# Patient Record
Sex: FEMALE | Race: WHITE | NOT HISPANIC OR LATINO | URBAN - METROPOLITAN AREA
[De-identification: names, ages, dates, MRNs, and addresses within clinical notes are randomized per-mention and may not be internally consistent; named-entity substitution may affect disease eponyms.]

---

## 2019-06-07 ENCOUNTER — EMERGENCY (EMERGENCY)
Facility: HOSPITAL | Age: 19
LOS: 0 days | Discharge: HOME | End: 2019-06-07
Attending: EMERGENCY MEDICINE | Admitting: EMERGENCY MEDICINE
Payer: MEDICAID

## 2019-06-07 VITALS
RESPIRATION RATE: 18 BRPM | DIASTOLIC BLOOD PRESSURE: 77 MMHG | HEART RATE: 84 BPM | SYSTOLIC BLOOD PRESSURE: 137 MMHG | OXYGEN SATURATION: 100 %

## 2019-06-07 VITALS
WEIGHT: 102.96 LBS | SYSTOLIC BLOOD PRESSURE: 141 MMHG | HEART RATE: 100 BPM | TEMPERATURE: 96 F | DIASTOLIC BLOOD PRESSURE: 70 MMHG | OXYGEN SATURATION: 100 % | HEIGHT: 61 IN | RESPIRATION RATE: 20 BRPM

## 2019-06-07 DIAGNOSIS — R10.32 LEFT LOWER QUADRANT PAIN: ICD-10-CM

## 2019-06-07 DIAGNOSIS — O26.891 OTHER SPECIFIED PREGNANCY RELATED CONDITIONS, FIRST TRIMESTER: ICD-10-CM

## 2019-06-07 PROCEDURE — 99284 EMERGENCY DEPT VISIT MOD MDM: CPT | Mod: 25

## 2019-06-07 NOTE — ED PROVIDER NOTE - OBJECTIVE STATEMENT
18F p/w first pregnancy, LMP 4/24/19, c/o mild intermittent LLQ pain. Denies f/c, dysuria, hematuria, flank pain, vag bleeding/discharge.

## 2019-06-07 NOTE — ED PROVIDER NOTE - PHYSICAL EXAMINATION
General: AOx4, Non toxic appearing, NAD, speaking in full sentences.   Skin: Warm and dry, no acute rash.   Head:  Normocephalic, atraumatic.   EENT: PERRL/EOMI, conjunctiva and sclera clear. MM moist, no nasal discharge.   Neck: Supple nt, no meningeal signs.   Lymph: No acute cervical lymphadenopathy  Heart RRR s1s2 nl, no rub/murmur.   Lungs- No retractions, BS equal, CTAB.   Abdomen: Soft ntnd no r/g.   Extremities- moves all, +equal distal pulses, brisk cap refill. No LE edema, calves nttp b/l.  Neuro: Sensation wnl, CN 2-12 grossly intact. +5/5 muscle strength throughout.  Psych: Cooperative, appropriate

## 2019-06-07 NOTE — ED PROVIDER NOTE - ATTENDING CONTRIBUTION TO CARE
18F p/w first pregnancy, LMP 4/24/19, c/o mild intermittent LLQ pain, dull, non radiating, no cp/sob, o n/v/d, no loc. Patient has never been pregnant before, no confirmed IUP for this pregnancy    CONSTITUTIONAL: Well-developed; well-nourished; in no acute distress. Sitting up and providing appropriate history and physical examination  SKIN: skin exam is warm and dry, no acute rash.  HEAD: Normocephalic; atraumatic.  EYES: PERRL, 3 mm bilateral, no nystagmus, EOM intact; conjunctiva and sclera clear.  ENT: No nasal discharge; airway clear.  NECK: Supple; non tender. + full passive ROM in all directions. No JVD  CARD: S1, S2 normal; no murmurs, gallops, or rubs. Regular rate and rhythm. + Symmetric Strong Pulses  RESP: No wheezes, rales or rhonchi. Good air movement bilaterally  ABD: soft; non-distended; non-tender. No Rebound, No Guarding, No signs of peritonitis, No CVA tenderness. No pulsatile abdominal mass. + Strong and Symmetric Pulses  EXT: Normal ROM. No clubbing, cyanosis or edema. Dp and Pt Pulses intact. Cap refill less than 3 seconds  NEURO: CN 2-12 intact, normal finger to nose, normal romberg, stable gait, no sensory or motor deficits, Alert, oriented, grossly unremarkable. No Focal deficits. GCS 15. NIH 0  PSYCH: Cooperative, appropriate.     bedside US: Unable to visualize POC under direct bedside transabdominal US

## 2019-06-07 NOTE — ED PROVIDER NOTE - NS ED ROS FT
Constitutional: NAD  Eyes: No visual changes, eye pain or discharge.  ENMT: No hearing changes, pain, discharge or infections. No neck pain or stiffness.  Cardiac: No chest pain, SOB or edema. No chest pain with exertion.  Respiratory: No cough or respiratory distress.   GI: No nausea, vomiting, diarrhea. +abdominal pain.  : No dysuria, frequency or burning.  MS: No myalgia, muscle weakness, joint pain or back pain.  Neuro: No headache or weakness. No LOC.  Skin: No skin rash.  Heme: No bruising

## 2019-06-07 NOTE — ED PROVIDER NOTE - CLINICAL SUMMARY MEDICAL DECISION MAKING FREE TEXT BOX
I personally evaluated the patient. I reviewed the Resident’s or Physician Assistant’s note (as assigned above), and agree with the findings and plan except as documented in my note. No US capability at the [place] at this time, no IUP confirmed, I instructed the patient that I would like to transfer her north for US, patient declined, asked to sign out ama and will follow with er OP OBGYN. The patient wishes to leave against medical advice.  I have discussed the risks, benefits and alternatives (including the possibility of worsening of disease, pain, permanent disability, and/or death) with the patient and his/her family (if available).  The patient voices understanding of these risks, benefits, and alternatives and still wishes to sign out against medical advice.  The patient is awake, alert, oriented x 3 and has demonstrated capacity to refuse/direct care.  I have advised the patient that they can and should return immediately should they develop any worse/different/additional symptoms, or if they change their mind and want to continue their care. Patient has full capacity and has verbalized understanding.  Given detailed returned precautions. Patient understands risk of missing ectopic

## 2019-06-07 NOTE — ED PROVIDER NOTE - NSFOLLOWUPCLINICS_GEN_ALL_ED_FT
[facility]  OB/GYN  [address] [place] [address]  Phone: [phone]  Fax:   Follow Up Time:

## 2019-06-07 NOTE — ED PROVIDER NOTE - PROGRESS NOTE DETAILS
workup discussed w/ pt, she is not amenable to transfer north. Attempted to evaluate w/ pocus but unable to visualize IUP. pt prefers to return to ED in AM when US is available. Discussed risks, recommended transfer North. Will AMA. Pt states she will return in AM. Gave return precautions.

## 2019-09-10 ENCOUNTER — EMERGENCY (EMERGENCY)
Facility: HOSPITAL | Age: 19
LOS: 0 days | Discharge: HOME | End: 2019-09-11
Attending: PEDIATRICS | Admitting: PEDIATRICS
Payer: MEDICAID

## 2019-09-10 VITALS
WEIGHT: 109.79 LBS | TEMPERATURE: 98 F | SYSTOLIC BLOOD PRESSURE: 122 MMHG | DIASTOLIC BLOOD PRESSURE: 56 MMHG | OXYGEN SATURATION: 98 % | HEART RATE: 75 BPM | RESPIRATION RATE: 20 BRPM

## 2019-09-10 DIAGNOSIS — O23.02 INFECTIONS OF KIDNEY IN PREGNANCY, SECOND TRIMESTER: ICD-10-CM

## 2019-09-10 DIAGNOSIS — Z3A.21 21 WEEKS GESTATION OF PREGNANCY: ICD-10-CM

## 2019-09-10 DIAGNOSIS — R10.9 UNSPECIFIED ABDOMINAL PAIN: ICD-10-CM

## 2019-09-10 DIAGNOSIS — Z79.2 LONG TERM (CURRENT) USE OF ANTIBIOTICS: ICD-10-CM

## 2019-09-10 DIAGNOSIS — N12 TUBULO-INTERSTITIAL NEPHRITIS, NOT SPECIFIED AS ACUTE OR CHRONIC: ICD-10-CM

## 2019-09-10 LAB
APPEARANCE UR: ABNORMAL
BILIRUB UR-MCNC: NEGATIVE — SIGNIFICANT CHANGE UP
COLOR SPEC: YELLOW — SIGNIFICANT CHANGE UP
DIFF PNL FLD: SIGNIFICANT CHANGE UP
GLUCOSE UR QL: NEGATIVE — SIGNIFICANT CHANGE UP
KETONES UR-MCNC: NEGATIVE — SIGNIFICANT CHANGE UP
LEUKOCYTE ESTERASE UR-ACNC: ABNORMAL
NITRITE UR-MCNC: NEGATIVE — SIGNIFICANT CHANGE UP
PH UR: 6.5 — SIGNIFICANT CHANGE UP (ref 5–8)
PROT UR-MCNC: ABNORMAL
SP GR SPEC: 1.01 — SIGNIFICANT CHANGE UP (ref 1.01–1.02)
UROBILINOGEN FLD QL: SIGNIFICANT CHANGE UP

## 2019-09-10 PROCEDURE — 99284 EMERGENCY DEPT VISIT MOD MDM: CPT

## 2019-09-10 RX ORDER — CEFPODOXIME PROXETIL 100 MG
1 TABLET ORAL
Qty: 20 | Refills: 0
Start: 2019-09-10 | End: 2019-09-19

## 2019-09-10 RX ORDER — ACETAMINOPHEN 500 MG
650 TABLET ORAL ONCE
Refills: 0 | Status: DISCONTINUED | OUTPATIENT
Start: 2019-09-10 | End: 2019-09-11

## 2019-09-10 NOTE — ED PROVIDER NOTE - NS ED ROS FT
Review of Systems         Constitutional: (-) fever (-) chills (-) weakness       Head: (-) trauma       EENT: (-) visual changes (-) sore throat       Cardiovascular: (-) chest pain (-) syncope       Respiratory: (-) cough, (-) shortness of breath       Gastrointestinal: (-) abdominal pain (-) vomiting (-) diarrhea (-) nausea       Genitourinary: (-) dysuria (+) frequency (-) hematuria       Musculoskeletal: (-) neck pain (-) back pain (-) joint pain       Integumentary: (-) rash       Neurological: (-) headache (-) altered mental status (-) dizziness (-) paresthesias       Psych: (-) psych history

## 2019-09-10 NOTE — ED PROVIDER NOTE - PATIENT PORTAL LINK FT
You can access the FollowMyHealth Patient Portal offered by [facility] by registering at the following website: [email or web address]. By joining [employer]’s FollowMyHealth portal, you will also be able to view your health information using other applications (apps) compatible with our system.

## 2019-09-10 NOTE — ED PROVIDER NOTE - PHYSICAL EXAMINATION
Physical Exam    Vital Signs: I have reviewed the initial vital signs  Constitutional: well-nourished, appears stated age, no acute distress  EENT: Conjunctiva pink, Sclera clear, PERRLA, EOMI. Mucous membranes moist, no exudates or lesions noted, uvula midline.  Cardiovascular: S1 and S2 present, regular rate, regular rhythm. Well perfused extremities, no peripheral edema  Respiratory: unlabored respiratory effort, clear to auscultation bilaterally no wheezing, rales or rhonchi  Gastrointestinal: soft, non-tender abdomen. No guarding or rebound tenderness  Musculoskeletal: supple nontender neck, no midline tenderness, no joint pain. + left sided CVAT  Integumentary: warm, dry, no rash  Neurologic: A & O x 3, all extremities’ motor and sensory functions grossly intact  Psychiatric: appropriate mood, appropriate affect

## 2019-09-10 NOTE — ED PROVIDER NOTE - NSFOLLOWUPINSTRUCTIONS_ED_ALL_ED_FT
-Take prescribed antibiotics as prescribed for your infection.  -Tylenol for pain  -Continue to push fluids  -Follow up with your OB/GYN in 1-3 days  -Return to ED for worsening symptoms or concerns.    Pyelonephritis During Pregnancy  Image   What are the causes?  This condition is caused by a bacterial infection in the lower urinary tract that spreads to the kidney.    What increases the risk?  You are more likely to develop this condition if:  You have diabetes.  You have a history of frequent urinary tract infections.  What are the signs or symptoms?  Symptoms of this condition may begin with symptoms of a lower urinary tract infection. These may include:  A frequent urge to pass urine.  Burning pain when passing urine.  Pain and pressure in your lower abdomen.  Blood in your urine.  Cloudy or smelly urine.  As the infection spreads to your kidney, you may have these symptoms:  Fever.  Chills.  Pain and tenderness in your upper abdomen or in your back and sides (flank pain). Flank pain often affects one side of the body, usually the right side.  Nausea, vomiting, or loss of appetite.  How is this diagnosed?  This condition may be diagnosed based on:  Your symptoms and medical history.  A physical exam.   Tests to confirm the diagnosis. These may include:  Blood tests to check kidney function and look for signs of infection.  Urine tests to check for signs of infection, including bacteria, white or red blood cells, and protein.  Tests to grow and identify the type of bacteria that is causing the infection (urine culture).  Imaging studies of your kidneys to learn more about your condition.  How is this treated?  This condition is treated in the hospital with antibiotics that are given into one of your veins through an IV. Your health care provider:  Will start you on an antibiotic that is effective against common urinary tract infections.  May switch to another antibiotic if the results of your urine culture show that your infection is caused by different bacteria.  Will be careful to choose antibiotics that are the safest during pregnancy.  Other treatments may include:  IV fluids if you are nauseous and not able to drink fluids.  Pain and fever medicines.  Medicines for nausea and vomiting.  You will be able to go home when your infection is under control. To prevent another infection, you may need to continue taking antibiotics by mouth until your baby is born.    Follow these instructions at home:  Medicines     Image   Take over-the-counter and prescription medicines only as told by your health care provider.  Take your antibiotic medicine as told by your health care provider. Do not stop taking the antibiotic even if you start to feel better.  Continue to take your prenatal vitamins.  Lifestyle     Image   Follow instructions from your health care provider about eating or drinking restrictions. You may need to avoid:  Foods and drinks with added sugar.  Caffeine and fruit juice.  Drink enough fluid to keep your urine pale yellow.  Go to the bathroom frequently. Do not hold your urine. Try to empty your bladder completely.  Change your underwear every day. Wear all-cotton underwear. Do not wear tight underwear or pants.  General instructions     Image   Take these steps to lower the risk of bacteria getting into your urinary tract:  Use liquid soap instead of bar soap when showering or bathing. Bacteria can grow on bar soap.  When you wash yourself, clean the urethra opening first. Use a washcloth to clean the area between your vagina and anus. Pat the area dry with a clean towel.  Wash your hands before and after you go to the bathroom.  Wipe yourself from front to back after going to the bathroom.  Do not use douches, perfumed soap, creams, or powders.  Do not soak in a bath for more than 30 minutes.  Return to your normal activities as told by your health care provider. Ask your health care provider what activities are safe for you.  Keep all follow-up visits as told by your health care provider. This is important.  Contact a health care provider if:  You have chills or a fever.  You have any symptoms of infection that do not get better at home.  Symptoms of infection come back.  You have a reaction or side effects from your antibiotic.  Get help right away if:  You start having contractions.  Summary  Pyelonephritis is an infection of the kidney or kidneys.  This condition results when a bacterial infection in the lower urinary tract spreads to the kidney.  Lower urinary tract infections are common during pregnancy.  Pyelonephritis causes chills, a fever, flank pain, and nausea.  Pyelonephritis is a serious infection that is usually treated in the hospital with IV antibiotics.

## 2019-09-10 NOTE — ED PROVIDER NOTE - OBJECTIVE STATEMENT
18 year old female G1PO 21 weeks gestation presents with left sided flank pain x 2 days. Patient states pain is in her left flank, radiating to front, mild, worsening., no pall/prov. Has nto taken any analgesia. Denies abd pain, fevers, chills, chest pain, shortness of breath, cough, dysuria, n/v, vaginal d/c, vaginal bleeding. patient admits to urgency and frequency.

## 2019-09-11 PROBLEM — Z78.9 OTHER SPECIFIED HEALTH STATUS: Chronic | Status: ACTIVE | Noted: 2019-06-07

## 2019-09-11 LAB
BACTERIA # UR AUTO: ABNORMAL
EPI CELLS # UR: 9 /HPF — HIGH (ref 0–5)
HYALINE CASTS # UR AUTO: 13 /LPF — HIGH (ref 0–7)
RBC CASTS # UR COMP ASSIST: 5 /HPF — HIGH (ref 0–4)
WBC UR QL: 109 /HPF — HIGH (ref 0–5)

## 2019-09-12 LAB
CULTURE RESULTS: SIGNIFICANT CHANGE UP
SPECIMEN SOURCE: SIGNIFICANT CHANGE UP

## 2019-12-29 ENCOUNTER — OUTPATIENT (OUTPATIENT)
Dept: OUTPATIENT SERVICES | Facility: HOSPITAL | Age: 19
LOS: 1 days | Discharge: HOME | End: 2019-12-29

## 2019-12-29 VITALS
DIASTOLIC BLOOD PRESSURE: 78 MMHG | TEMPERATURE: 99 F | RESPIRATION RATE: 16 BRPM | SYSTOLIC BLOOD PRESSURE: 129 MMHG | HEART RATE: 70 BPM

## 2019-12-29 LAB
ALBUMIN SERPL ELPH-MCNC: 3.4 G/DL — LOW (ref 3.5–5.2)
ALP SERPL-CCNC: 211 U/L — HIGH (ref 30–115)
ALT FLD-CCNC: 10 U/L — SIGNIFICANT CHANGE UP (ref 0–41)
ANION GAP SERPL CALC-SCNC: 15 MMOL/L — HIGH (ref 7–14)
APPEARANCE UR: CLEAR — SIGNIFICANT CHANGE UP
AST SERPL-CCNC: 17 U/L — SIGNIFICANT CHANGE UP (ref 0–41)
BASOPHILS # BLD AUTO: 0.03 K/UL — SIGNIFICANT CHANGE UP (ref 0–0.2)
BASOPHILS NFR BLD AUTO: 0.3 % — SIGNIFICANT CHANGE UP (ref 0–1)
BILIRUB SERPL-MCNC: 0.3 MG/DL — SIGNIFICANT CHANGE UP (ref 0.2–1.2)
BILIRUB UR-MCNC: NEGATIVE — SIGNIFICANT CHANGE UP
BUN SERPL-MCNC: 9 MG/DL — LOW (ref 10–20)
CALCIUM SERPL-MCNC: 8.9 MG/DL — SIGNIFICANT CHANGE UP (ref 8.5–10.1)
CHLORIDE SERPL-SCNC: 103 MMOL/L — SIGNIFICANT CHANGE UP (ref 98–110)
CO2 SERPL-SCNC: 20 MMOL/L — SIGNIFICANT CHANGE UP (ref 17–32)
COLOR SPEC: YELLOW — SIGNIFICANT CHANGE UP
CREAT SERPL-MCNC: 0.6 MG/DL — LOW (ref 0.7–1.5)
DIFF PNL FLD: NEGATIVE — SIGNIFICANT CHANGE UP
EOSINOPHIL # BLD AUTO: 0.05 K/UL — SIGNIFICANT CHANGE UP (ref 0–0.7)
EOSINOPHIL NFR BLD AUTO: 0.5 % — SIGNIFICANT CHANGE UP (ref 0–8)
GLUCOSE SERPL-MCNC: 96 MG/DL — SIGNIFICANT CHANGE UP (ref 70–99)
GLUCOSE UR QL: NEGATIVE — SIGNIFICANT CHANGE UP
HCT VFR BLD CALC: 30.9 % — LOW (ref 37–47)
HGB BLD-MCNC: 10.8 G/DL — LOW (ref 12–16)
IMM GRANULOCYTES NFR BLD AUTO: 1 % — HIGH (ref 0.1–0.3)
KETONES UR-MCNC: NEGATIVE — SIGNIFICANT CHANGE UP
LDH SERPL L TO P-CCNC: 182 — SIGNIFICANT CHANGE UP (ref 50–242)
LEUKOCYTE ESTERASE UR-ACNC: NEGATIVE — SIGNIFICANT CHANGE UP
LYMPHOCYTES # BLD AUTO: 1.73 K/UL — SIGNIFICANT CHANGE UP (ref 1.2–3.4)
LYMPHOCYTES # BLD AUTO: 17.2 % — LOW (ref 20.5–51.1)
MCHC RBC-ENTMCNC: 30 PG — SIGNIFICANT CHANGE UP (ref 27–31)
MCHC RBC-ENTMCNC: 35 G/DL — SIGNIFICANT CHANGE UP (ref 32–37)
MCV RBC AUTO: 85.8 FL — SIGNIFICANT CHANGE UP (ref 81–99)
MONOCYTES # BLD AUTO: 0.66 K/UL — HIGH (ref 0.1–0.6)
MONOCYTES NFR BLD AUTO: 6.6 % — SIGNIFICANT CHANGE UP (ref 1.7–9.3)
NEUTROPHILS # BLD AUTO: 7.48 K/UL — HIGH (ref 1.4–6.5)
NEUTROPHILS NFR BLD AUTO: 74.4 % — SIGNIFICANT CHANGE UP (ref 42.2–75.2)
NITRITE UR-MCNC: NEGATIVE — SIGNIFICANT CHANGE UP
NRBC # BLD: 0 /100 WBCS — SIGNIFICANT CHANGE UP (ref 0–0)
PH UR: 6.5 — SIGNIFICANT CHANGE UP (ref 5–8)
PLATELET # BLD AUTO: 239 K/UL — SIGNIFICANT CHANGE UP (ref 130–400)
POTASSIUM SERPL-MCNC: 4.2 MMOL/L — SIGNIFICANT CHANGE UP (ref 3.5–5)
POTASSIUM SERPL-SCNC: 4.2 MMOL/L — SIGNIFICANT CHANGE UP (ref 3.5–5)
PROT SERPL-MCNC: 5.9 G/DL — LOW (ref 6–8)
PROT UR-MCNC: ABNORMAL
RBC # BLD: 3.6 M/UL — LOW (ref 4.2–5.4)
RBC # FLD: 12.5 % — SIGNIFICANT CHANGE UP (ref 11.5–14.5)
SODIUM SERPL-SCNC: 138 MMOL/L — SIGNIFICANT CHANGE UP (ref 135–146)
SP GR SPEC: 1.02 — SIGNIFICANT CHANGE UP (ref 1.01–1.02)
URATE SERPL-MCNC: 5.2 MG/DL — SIGNIFICANT CHANGE UP (ref 2.5–7)
UROBILINOGEN FLD QL: SIGNIFICANT CHANGE UP
WBC # BLD: 10.05 K/UL — SIGNIFICANT CHANGE UP (ref 4.8–10.8)
WBC # FLD AUTO: 10.05 K/UL — SIGNIFICANT CHANGE UP (ref 4.8–10.8)

## 2019-12-29 NOTE — OB RN TRIAGE NOTE - NS_TRIAGEADDITIONAL COMMENTS_OBGYN_ALL_OB_FT
Dr Perrin at bedside, pt states "I want to leave", pt educated on risk factors.  Patient verbalized understanding.  Dr Jerez made aware.  Marlene Cortés, AND notified and made aware.

## 2019-12-29 NOTE — OB PROVIDER TRIAGE NOTE - NSHPPHYSICALEXAM_GEN_ALL_CORE
Vital Signs Last 24 Hrs  T(C): 37 (29 Dec 2019 21:14), Max: 37 (29 Dec 2019 21:14)  T(F): 98.6 (29 Dec 2019 21:14), Max: 98.6 (29 Dec 2019 21:14)  HR: 66 (29 Dec 2019 22:11) (66 - 78)  BP: 121/68 (29 Dec 2019 22:26) (118/69 - 147/81) first elevated 12/29 @2140  RR: 16 (29 Dec 2019 21:14) (16 - 16)    Gen: NAD, sitting comfortably  Abd: Gravid, soft, NT, no palpable ctx  SVE: deferred per PMD  EFM: 130/mod/+accels, cat I  [place]: irregular  EFW by Leopold's: 3200g Vital Signs Last 24 Hrs  T(C): 37 (29 Dec 2019 21:14), Max: 37 (29 Dec 2019 21:14)  T(F): 98.6 (29 Dec 2019 21:14), Max: 98.6 (29 Dec 2019 21:14)  HR: 66 (29 Dec 2019 22:11) (66 - 78)  BP: 121/68 (29 Dec 2019 22:26) (118/69 - 147/81) first elevated 12/29 @2140  RR: 16 (29 Dec 2019 21:14) (16 - 16)    Gen: NAD, sitting comfortably  Abd: Gravid, soft, NT, no palpable ctx  SVE: deferred per PMD  EFM: 130/mod/+accels, cat I  [place]: irregular  EFW by Leopold's: 3200g  BSS: ceph, post fundal placenta, EFW 2597g (37.6%), BPP 8/8, MVP 5.35cm Vital Signs Last 24 Hrs  T(C): 37 (29 Dec 2019 21:14), Max: 37 (29 Dec 2019 21:14)  T(F): 98.6 (29 Dec 2019 21:14), Max: 98.6 (29 Dec 2019 21:14)  HR: 66 (29 Dec 2019 22:11) (66 - 78)  BP: 121/68 (29 Dec 2019 22:26) (118/69 - 147/81) first elevated 12/29 @2140  RR: 16 (29 Dec 2019 21:14) (16 - 16)    Gen: NAD, sitting comfortably  Cardio: RRR, no m/r/g  Resp: CTAB, no w/r/r  Abd: Gravid, soft, NT, no palpable ctx  Ext: 2+ BL LE swelling, 2+ upper extremity DTRs  SVE: deferred per PMD  EFM: 130/mod/+accels, cat I  [place]: irregular  EFW by Leopold's: 3200g  BSS: ceph, post fundal placenta, EFW 2597g (37.6%), BPP 8/8, MVP 5.35cm

## 2019-12-29 NOTE — OB PROVIDER TRIAGE NOTE - HISTORY OF PRESENT ILLNESS
18yo  @ 20yo  @35w4d with BELKIS  by LMP  consistent with first trimester sono per patient here for BL lower extremity swelling for the last 2 weeks, worsening over the last 2 days, no calf tenderness.  Denies VB, LOF, ctx.  Good FM.  Denies headache, changes in vision, chest pain, SOB, RUQ/epigastric pain, N/V, fevers, chills, diarrhea, constipation, dysuria.  Sees Dr. Muir at [facility], last visit was , PMD at that time said LE swelling was within normal limits.  Reports she was prescribed PO iron but has not been taking it.  Last PO intake was 1730.  Does not remember when she had her last BM.  Last intercourse 3 days ago.  Denies any complications with this pregnancy, did not bring records with her at this time.

## 2019-12-29 NOTE — OB PROVIDER TRIAGE NOTE - ADDITIONAL INSTRUCTIONS
If you notice bright red blood enough to soak a pad, a large gush of fluid or continuous leakage of fluid, or decreased fetal movement please come to labor and delivery.  If you notice a headache that won't go away, changes in vision, chest pain, shortness of breath, abdominal pain, severe leg swelling or pain please call your provider or go to the hospital. Fetal kick counts should be monitored, and 5-6 kicks should be felt per hour.  If less, please call your doctor.  Please follow up with your primary ObGyn as soon as possible.  If you have any questions about your time here, call [phone].

## 2019-12-29 NOTE — OB PROVIDER TRIAGE NOTE - NSOBPROVIDERNOTE_OBGYN_ALL_OB_FT
20yo  @35w4d, GBS unknown, r/o gHTN vs. preeclampsia, NST reactive, maternal and fetal wellbeing reassuring  -BPs q15m  -bedrest with bathroom privileges  -cont efm/toco  -f/u PELs, urine protein creatinine ratio  -obtain PNC from Dr. Wood Jerez and Dr. Miller aware. 18yo  @35w4d, GBS unknown, r/o gHTN vs. preeclampsia, BPP 10/10, maternal and fetal wellbeing reassuring  -BPs q15m  -bedrest with bathroom privileges  -cont efm/toco  -f/u PELs, urine protein creatinine ratio  -obtain PNC from Dr. Wood Jerez and Dr. Miller aware.

## 2019-12-30 VITALS — RESPIRATION RATE: 18 BRPM | TEMPERATURE: 98 F

## 2019-12-30 LAB
BACTERIA # UR AUTO: NEGATIVE — SIGNIFICANT CHANGE UP
BLD GP AB SCN SERPL QL: SIGNIFICANT CHANGE UP
CREAT ?TM UR-MCNC: 133 MG/DL — SIGNIFICANT CHANGE UP
EPI CELLS # UR: 9 /HPF — HIGH (ref 0–5)
HYALINE CASTS # UR AUTO: 3 /LPF — SIGNIFICANT CHANGE UP (ref 0–7)
PROT ?TM UR-MCNC: 55 MG/DLG/24H — SIGNIFICANT CHANGE UP
PROT/CREAT UR-RTO: 0.4 RATIO — HIGH (ref 0–0.2)
RBC CASTS # UR COMP ASSIST: 3 /HPF — SIGNIFICANT CHANGE UP (ref 0–4)
WBC UR QL: 12 /HPF — HIGH (ref 0–5)

## 2019-12-30 NOTE — PROGRESS NOTE ADULT - SUBJECTIVE AND OBJECTIVE BOX
Subjective:   Pt evaluated at bedside. Continues to deny headache, changes in vision, chest pain, SOB, RUQ/epigastric pain, N/V, fevers, chills, diarrhea.  Report stable BL LE swelling, no calf pain.  Desires discharge against medical advice.    Objective:   Vital Signs Last 24 Hrs  T(C): 37 (29 Dec 2019 21:14), Max: 37 (29 Dec 2019 21:14)  T(F): 98.6 (29 Dec 2019 21:14), Max: 98.6 (29 Dec 2019 21:14)  HR: 71 (30 Dec 2019 00:08) (59 - 78)  BP: 138/81 (30 Dec 2019 00:08) (118/69 - 147/85) First elevated  @2140  RR: 16 (29 Dec 2019 21:14) (16 - 16)    EFM: 130/mod/+accel, cat I  TOCO: irregular  SVE: deferred per PMD  Gen: NAD, AAOx3  CV: RRR, no M/R/G  Pulm: CTAB, no R/R/W  Abd: soft, gravid, nontender, no rebound or guarding, no epigastric tenderness, +BS.   Ext: +2 LE edema jose, no calf tenderness  Neuro: 2+ BL upper extremity reflexes      penicillin (Hives)      Labs:                        10.8   10.05 )-----------( 239      ( 29 Dec 2019 22:11 )             30.9         138  |  103  |  9<L>  ----------------------------<  96  4.2   |  20  |  0.6<L>    Ca    8.9      29 Dec 2019 22:11    TPro  5.9<L>  /  Alb  3.4<L>  /  TBili  0.3  /  DBili  x   /  AST  17  /  ALT  10  /  AlkPhos  211<H>      Uric Acid, Serum: 5.2 mg/dL ( @ 22:11)  Lactate Dehydrogenase, Serum (.19 @ 22:11)    Lactate Dehydrogenase, Serum: 182  Urinalysis Basic - ( 29 Dec 2019 22:11 )    Color: Yellow / Appearance: Clear / S.022 / pH: x  Gluc: x / Ketone: Negative  / Bili: Negative / Urobili: <2 mg/dL   Blood: x / Protein: 30 mg/dL / Nitrite: Negative   Leuk Esterase: Negative / RBC: 3 /HPF / WBC 12 /HPF   Sq Epi: x / Non Sq Epi: 9 /HPF / Bacteria: Negative

## 2019-12-30 NOTE — PROGRESS NOTE ADULT - ASSESSMENT
A/P: 20yo  @35w5d, GBS unknown, cannot r/o gHTN vs. preeclampsia, asymptomatic, BPP 10/10, maternal and fetal wellbeing reassuring    -Discharge home against medical advice: risks explained including but not limited to stroke, PE, myocardial infarction, fetal distress, seizures, fetal demise and death.  Patient agreed to accept these risks and signed the form stating her understanding.  -to follow up with PMD as soon as possible  -f/u urine protein creatinine ratio  -hypertensive and labor precautions discussed  -encouraged leg elevation and compression socks for BL LE swelling  -FKC encouraged, PO hydration encouraged    Dr. Jerez and Dr. Miller aware.

## 2020-01-30 ENCOUNTER — EMERGENCY (EMERGENCY)
Facility: HOSPITAL | Age: 20
LOS: 0 days | Discharge: HOME | End: 2020-01-30
Attending: EMERGENCY MEDICINE | Admitting: EMERGENCY MEDICINE
Payer: MEDICAID

## 2020-01-30 VITALS
SYSTOLIC BLOOD PRESSURE: 111 MMHG | DIASTOLIC BLOOD PRESSURE: 53 MMHG | OXYGEN SATURATION: 100 % | RESPIRATION RATE: 17 BRPM | TEMPERATURE: 98 F | HEART RATE: 68 BPM
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
## 2020-01-30 DIAGNOSIS — Z88.0 ALLERGY STATUS TO PENICILLIN: ICD-10-CM

## 2020-01-30 LAB
ALBUMIN SERPL ELPH-MCNC: 3.2 G/DL — LOW (ref 3.5–5.2)
ALP SERPL-CCNC: 127 U/L — HIGH (ref 30–115)
ALT FLD-CCNC: 31 U/L — SIGNIFICANT CHANGE UP (ref 0–41)
ANION GAP SERPL CALC-SCNC: 12 MMOL/L — SIGNIFICANT CHANGE UP (ref 7–14)
AST SERPL-CCNC: 39 U/L — SIGNIFICANT CHANGE UP (ref 0–41)
BASOPHILS # BLD AUTO: 0.09 K/UL — SIGNIFICANT CHANGE UP (ref 0–0.2)
BASOPHILS NFR BLD AUTO: 0.5 % — SIGNIFICANT CHANGE UP (ref 0–1)
BILIRUB SERPL-MCNC: 0.6 MG/DL — SIGNIFICANT CHANGE UP (ref 0.2–1.2)
BUN SERPL-MCNC: 18 MG/DL — SIGNIFICANT CHANGE UP (ref 10–20)
CALCIUM SERPL-MCNC: 9 MG/DL — SIGNIFICANT CHANGE UP (ref 8.5–10.1)
CHLORIDE SERPL-SCNC: 103 MMOL/L — SIGNIFICANT CHANGE UP (ref 98–110)
CO2 SERPL-SCNC: 23 MMOL/L — SIGNIFICANT CHANGE UP (ref 17–32)
CREAT SERPL-MCNC: 0.7 MG/DL — SIGNIFICANT CHANGE UP (ref 0.7–1.5)
EOSINOPHIL # BLD AUTO: 0.15 K/UL — SIGNIFICANT CHANGE UP (ref 0–0.7)
EOSINOPHIL NFR BLD AUTO: 0.9 % — SIGNIFICANT CHANGE UP (ref 0–8)
GLUCOSE SERPL-MCNC: 104 MG/DL — HIGH (ref 70–99)
HCT VFR BLD CALC: 25.6 % — LOW (ref 37–47)
HGB BLD-MCNC: 8.7 G/DL — LOW (ref 12–16)
IMM GRANULOCYTES NFR BLD AUTO: 1.6 % — HIGH (ref 0.1–0.3)
LYMPHOCYTES # BLD AUTO: 1.65 K/UL — SIGNIFICANT CHANGE UP (ref 1.2–3.4)
LYMPHOCYTES # BLD AUTO: 10 % — LOW (ref 20.5–51.1)
MCHC RBC-ENTMCNC: 29.3 PG — SIGNIFICANT CHANGE UP (ref 27–31)
MCHC RBC-ENTMCNC: 34 G/DL — SIGNIFICANT CHANGE UP (ref 32–37)
MCV RBC AUTO: 86.2 FL — SIGNIFICANT CHANGE UP (ref 81–99)
MONOCYTES # BLD AUTO: 0.89 K/UL — HIGH (ref 0.1–0.6)
MONOCYTES NFR BLD AUTO: 5.4 % — SIGNIFICANT CHANGE UP (ref 1.7–9.3)
NEUTROPHILS # BLD AUTO: 13.52 K/UL — HIGH (ref 1.4–6.5)
NEUTROPHILS NFR BLD AUTO: 81.6 % — HIGH (ref 42.2–75.2)
NRBC # BLD: 0 /100 WBCS — SIGNIFICANT CHANGE UP (ref 0–0)
PLATELET # BLD AUTO: 538 K/UL — HIGH (ref 130–400)
POTASSIUM SERPL-MCNC: 3.8 MMOL/L — SIGNIFICANT CHANGE UP (ref 3.5–5)
POTASSIUM SERPL-SCNC: 3.8 MMOL/L — SIGNIFICANT CHANGE UP (ref 3.5–5)
PROT SERPL-MCNC: 6.3 G/DL — SIGNIFICANT CHANGE UP (ref 6–8)
RBC # BLD: 2.97 M/UL — LOW (ref 4.2–5.4)
RBC # FLD: 13.8 % — SIGNIFICANT CHANGE UP (ref 11.5–14.5)
SODIUM SERPL-SCNC: 138 MMOL/L — SIGNIFICANT CHANGE UP (ref 135–146)
WBC # BLD: 16.56 K/UL — HIGH (ref 4.8–10.8)
WBC # FLD AUTO: 16.56 K/UL — HIGH (ref 4.8–10.8)

## 2020-01-30 PROCEDURE — 99284 EMERGENCY DEPT VISIT MOD MDM: CPT

## 2020-01-30 RX ORDER — AZTREONAM 2 G
1 VIAL (EA) INJECTION
Qty: 14 | Refills: 0
Start: 2020-01-30 | End: 2020-02-05

## 2020-01-30 RX ADMIN — Medication 1 TABLET(S): at 05:56

## 2020-01-30 NOTE — CONSULT NOTE ADULT - ASSESSMENT
20yo  s/p primary LTCS for failure to dilate, POD#15, with post- wound infection, clinically and hemodynamically stable  -Wound drained and packed with 1/4" packing, patient tolerated well  -recommend CT abd/pelvis to ensure no deeper fluid collections  -recommend antibiotic prophylaxis with Bactrim DS BID x5 days  -sent Percocet to pharmacy, to use prior to repacking  -f/u wound culture  -recommend f/u at [facility] in 1-2 to remove and change packing, if patient is unable to schedule appointment then recommend removal of packing in 2 days and follow up at [facility] as soon as possible    Dr. Plascencia and Dr. Roy aware. 20yo  s/p primary LTCS for failure to dilate, POD#15, with post- wound infection, clinically and hemodynamically stable  -Wound drained and packed with 1/4" packing, patient tolerated well  -recommend CT abd/pelvis to ensure no deeper fluid collections  -recommend antibiotic prophylaxis with Bactrim DS BID x5 days  -sent Naproxen to pharmacy, to use prior to repacking  -f/u wound culture  -recommend f/u at [facility] in 1-2 to remove and change packing, if patient is unable to schedule appointment then recommend removal of packing in 2 days and follow up at [facility] as soon as possible    Dr. Plascencia and Dr. Roy aware.

## 2020-01-30 NOTE — ED PROVIDER NOTE - OBJECTIVE STATEMENT
pt presents to ED c/o bleeding from  site for 1 day. ob  at [facility], but  done by another physician, cannot recall name, also at [facility]. s/p  2 weeks ago, has been taking tylenol for pain. last dose a few hrs ago. Denies fever/chill/HA/dizziness/chest pain/palpitation/sob/abd pain/n/v/d/ black stool/bloody stool/urinary sxs

## 2020-01-30 NOTE — ED PROVIDER NOTE - ATTENDING CONTRIBUTION TO CARE
19y f s/p  1/15/20 @ [facility] by on-call service ObGyn (pt does not recall name of doctor) p/w drainage from wound x 1d. Rpts incr swelling under L side of  scar x few days, had followed up with her private Gyn Dr. Muir few days ago (did not perform ) and was told wound was healing well. Got into car this evening and noticed sudden-onset drainage of serosanguinous fluid from L side of surgical site. Wound draining on arrival, soaked through several 4x4s. Has been having vaginal spottin since delivery, otherwise no other sx. Denies any f/c, nvd, vag discharge.    PE:  nad  skin warm, dry  ncat  neck supple  rrr nl s1s2 no mrg  ctab no wrr  abd soft, healing  scar with active serosanguinous drainage from L side of wound, no discrete fluctuacne, no surrounding erythema, remainder of abd ntnd no palpable masses no rgr  back non-tender no cvat  ext no cce dpi  neuro aaox3 grossly nf exam

## 2020-01-30 NOTE — ED PROVIDER NOTE - PROGRESS NOTE DETAILS
ob/gyn c/s called, plan for labs and imaging GYN resident evaluated patient, open and irrigated wound. wound cx sent/ f/u with her at [facility] or her GYN patient was offered CT to evaluated the wound and patient refused.

## 2020-01-30 NOTE — CONSULT NOTE ADULT - SUBJECTIVE AND OBJECTIVE BOX
Chief Complaint: c/s incisional bleeding    HPI: 20yo  s/p primary LTCS for failure to dilate after IOL for preeclampsia, POD#15, presenting to the ED for incisional bleeding starting 3 hours ago suddenly. Denies any trauma, lifting or straining.     Location -  Severity -  Quality -  Duration -  Timing -   Modifying Factors -   Associated Signs/Symptoms -     Ob/Gyn History:  G P                 LMP -                   Cycle Length -   Denies history of ovarian cysts, uterine fibroids, abnormal paps, or STIs  Last Pap Smear -   Last Mammogram -   Last Colonoscopy -        Denies the following: constitutional symptoms, visual symptoms, cardiovascular symptoms, respiratory symptoms, GI symptoms, musculoskeletal symptoms, skin symptoms, neurologic symptoms, hematologic symptoms, allergic symptoms, psychiatric symptoms  Except any pertinent positives listed.     Home Medications:      Allergies    penicillin (Hives)    Intolerances        PAST MEDICAL & SURGICAL HISTORY:  No pertinent past medical history  No significant past surgical history      FAMILY HISTORY:  No pertinent family history in first degree relatives      SOCIAL HISTORY: Denies cigarette use, alcohol use, or illicit drug use    Vital Signs Last 24 Hrs  T(F): 98.5 (2020 01:08), Max: 98.5 (2020 01:08)  HR: 68 (2020 01:08) (68 - 68)  BP: 111/53 (2020 01:08) (111/53 - 111/53)  RR: 17 (2020 01:08) (17 - 17)      General Appearance - AAOx3, NAD  Heart - S1S2 regular rate and rhythm  Lung - CTA Bilaterally  Abdomen - Soft, nontender, nondistended, no rebound, no rigidity, no guarding, bowel sounds present    GYN/Pelvis:    Labia Majora - Normal  Labia Minora - Normal  Clitoris - Normal  Urethra - Normal  Vagina - Normal  Cervix - Normal    Uterus:  Size - Normal  Tenderness - None  Mass - None  Freely mobile    Adnexa:  Masses - None  Tenderness - None      Meds:         LABS:                        8.7    16.56 )-----------( 538      ( 2020 02:00 )             25.6         -    138  |  103  |  18  ----------------------------<  104<H>  3.8   |  23  |  0.7    Ca    9.0      2020 02:00    TPro  6.3  /  Alb  3.2<L>  /  TBili  0.6  /  DBili  x   /  AST  39  /  ALT  31  /  AlkPhos  127<H>              RADIOLOGY & ADDITIONAL STUDIES: Chief Complaint: c/s incisional bleeding    HPI: 18yo  s/p primary LTCS for failure to dilate after IOL for preeclampsia, POD#15, presenting to the ED for incisional bleeding starting 3 hours ago suddenly. Denies any trauma, lifting or straining. In the ED, has used 4-5 4x4 in an hour to control bleeding.  Notice some swelling around the incision on the left side since yesterday that is now resolving.  Denies heavy vaginal bleeding, diarrhea, constipation, N/V, fevers, chills, severe abdominal pain. Was taking labetalol 200 BID for 2 weeks per her PMD.  Denies headache, changes in vision, chest pain, SOB, RUQ/epigastric pain, LE pain/swelling.    Ob/Gyn History:          FT LTCS x1 for arrest of dilation, preeclampsia without severe features.            Denies history of ovarian cysts, uterine fibroids, abnormal paps, or STIs  Last Pap Smear unknown      Denies the following: constitutional symptoms, visual symptoms, cardiovascular symptoms, respiratory symptoms, GI symptoms, musculoskeletal symptoms, skin symptoms, neurologic symptoms, hematologic symptoms, allergic symptoms, psychiatric symptoms  Except any pertinent positives listed.     Home Medications:  None    Allergies    penicillin (Hives)            PAST MEDICAL & SURGICAL HISTORY:  No pertinent past medical history  Primary  section      FAMILY HISTORY:  No pertinent family history in first degree relatives      SOCIAL HISTORY: Denies cigarette use, alcohol use, or illicit drug use    Vital Signs Last 24 Hrs  T(F): 98.5 (2020 01:08), Max: 98.5 (2020 01:08)  HR: 68 (2020 01:08) (68 - 68)  BP: 111/53 (2020 01:08) (111/53 - 111/53)  RR: 17 (2020 01:08) (17 - 17)    General Appearance - AAOx3, NAD  Heart - S1S2 regular rate and rhythm  Lung - CTA Bilaterally  Abdomen - Soft, nontender, nondistended, no rebound, no rigidity, no guarding, bowel sounds present, uterus firm and 2cm below umbilicus  Incision - Well-healed pfannenstiel incision with 1cm opening left of midline, 20-25cc of bright red bloody fluid expressed.  On probing, 3cm deep, 5cm to the left and 4cm to the right.  Mild erythema surrounding incision.  Incision packed with 1/4" packing.     GYN/Pelvis:    Labia Majora - Normal  Labia Minora - Normal  Clitoris - Normal  Urethra - Normal  Vagina - 5cc of dark red clots in vaginal vault  Cervix - normal, no CMT    Uterus:  Size - Normal, anteverted  Tenderness - None  Mass - None  Freely mobile    Adnexa:  Masses - None  Tenderness - None        LABS:                        8.7    16.56 )-----------( 538      ( 2020 02:00 )             25.6             138  |  103  |  18  ----------------------------<  104<H>  3.8   |  23  |  0.7    Ca    9.0      2020 02:00    TPro  6.3  /  Alb  3.2<L>  /  TBili  0.6  /  DBili  x   /  AST  39  /  ALT  31  /  AlkPhos  127<H>              RADIOLOGY & ADDITIONAL STUDIES: Chief Complaint: c/s incisional bleeding    HPI: 18yo  s/p primary LTCS for failure to dilate after IOL for preeclampsia, POD#15, presenting to the ED for incisional bleeding starting 3 hours ago suddenly. Denies any trauma, lifting or straining. In the ED, has used 4-5 4x4 in an hour to control bleeding.  Notice some swelling around the incision on the left side since yesterday that is now resolving after draining.  Denies heavy vaginal bleeding, diarrhea, constipation, N/V, fevers, chills, severe abdominal pain. Was taking labetalol 200 BID for 2 weeks per her PMD.  Denies headache, changes in vision, chest pain, SOB, RUQ/epigastric pain, LE pain/swelling.    Ob/Gyn History:          FT LTCS x1 for arrest of dilation, preeclampsia without severe features.            Denies history of ovarian cysts, uterine fibroids, abnormal paps, or STIs  Last Pap Smear unknown      Denies the following: constitutional symptoms, visual symptoms, cardiovascular symptoms, respiratory symptoms, GI symptoms, musculoskeletal symptoms, skin symptoms, neurologic symptoms, hematologic symptoms, allergic symptoms, psychiatric symptoms  Except any pertinent positives listed.     Home Medications:  None    Allergies    penicillin (Hives)            PAST MEDICAL & SURGICAL HISTORY:  No pertinent past medical history  Primary  section      FAMILY HISTORY:  No pertinent family history in first degree relatives      SOCIAL HISTORY: Denies cigarette use, alcohol use, or illicit drug use    Vital Signs Last 24 Hrs  T(F): 98.5 (2020 01:08), Max: 98.5 (2020 01:08)  HR: 68 (2020 01:08) (68 - 68)  BP: 111/53 (2020 01:08) (111/53 - 111/53)  RR: 17 (2020 01:08) (17 - 17)    General Appearance - AAOx3, NAD  Heart - S1S2 regular rate and rhythm  Lung - CTA Bilaterally  Abdomen - Soft, nontender, nondistended, no rebound, no rigidity, no guarding, bowel sounds present, uterus firm and 2cm below umbilicus  Incision - pfannenstiel incision with 1cm opening midline, 20-25cc of bright red bloody fluid expressed.  On probing, 3cm deep, 5cm to the left and 4cm to the right.  Mild erythema surrounding incision.  Incision packed with 1/4" packing.     GYN/Pelvis:    Labia Majora - Normal  Labia Minora - Normal  Clitoris - Normal  Urethra - Normal  Vagina - 5cc of dark red clots in vaginal vault  Cervix - normal, no CMT    Uterus:  Size - Normal, anteverted  Tenderness - None  Mass - None  Freely mobile    Adnexa:  Masses - None  Tenderness - None        LABS:                        8.7    16.56 )-----------( 538      ( 2020 02:00 )             25.6             138  |  103  |  18  ----------------------------<  104<H>  3.8   |  23  |  0.7    Ca    9.0      2020 02:00    TPro  6.3  /  Alb  3.2<L>  /  TBili  0.6  /  DBili  x   /  AST  39  /  ALT  31  /  AlkPhos  127<H>              RADIOLOGY & ADDITIONAL STUDIES:

## 2020-01-30 NOTE — ED PROVIDER NOTE - CLINICAL SUMMARY MEDICAL DECISION MAKING FREE TEXT BOX
post  wound inf/abscess - labs wnl, ObGyn consulted and rec CT AP to r/o deeper infection however pt refused CT - wound site drained & packed @ bedside by ObGyn team, wound Cx ordered and sent by ObGyn, rec bactrim x 7d and close OP f/u @ [facility] or with private ObGyn in 1-2d, pt instructed on wound care / packin changes @ home - all results d/w pt &  @ bedside and copies given, strict return precautions discussed, Rx given, rec close op ObGYn f/u as stated

## 2020-01-30 NOTE — ED PROVIDER NOTE - PHYSICAL EXAMINATION
CONSTITUTIONAL: Well-appearing; well-nourished; in no apparent distress.   GI/: mildly ttp at csection site, otherwise non-distended; no palpable organomegaly.    SKIN: bleeding from small punctate opening left side of  wound, ?purulence vs serous fluid mixed with blood; otherwise normal for age and race; warm; dry; good turgor; no apparent lesions or exudate.   NEURO/PSYCH: A & O x 4; grossly unremarkable. mood and manner are appropriate. Grooming and personal hygiene are appropriate. No apparent thoughts of harm to self or others.

## 2020-01-30 NOTE — ED PROVIDER NOTE - PATIENT PORTAL LINK FT
You can access the FollowMyHealth Patient Portal offered by [facility] by registering at the following website: [email or web address]. By joining [employer]’s FollowMyHealth portal, you will also be able to view your health information using other applications (apps) compatible with our system.

## 2020-02-11 ENCOUNTER — APPOINTMENT (OUTPATIENT)
Dept: OBGYN | Facility: CLINIC | Age: 20
End: 2020-02-11

## 2020-02-11 PROBLEM — Z00.00 ENCOUNTER FOR PREVENTIVE HEALTH EXAMINATION: Status: ACTIVE | Noted: 2020-02-11

## 2020-12-04 NOTE — OB RN TRIAGE NOTE - NS_ATTENDNOTIFIED_OBGYN_ALL_OB
[FreeTextEntry1] : Labs show\par -Type 2 diabetes uncontrolled= increase metformin 1000 mg b.i.d.\par -Potassium 5.4-check 3 weeks\par -Calcium of 11.5 = stable, results faxed to endocrinology\par -Significantly elevated triglycerides with low HDL= recommend adding vascepa No Yes

## 2021-02-03 NOTE — OB RN TRIAGE NOTE - TOBACCO USE
[] : No [Yes] : Yes [2 - 4 times a month (2 pts)] : 2-4 times a month (2 points) [1 or 2 (0 pts)] : 1 or 2 (0 points) [Never (0 pts)] : Never (0 points) [No falls in past year] : Patient reported no falls in the past year [No] : In the past 12 months have you used drugs other than those required for medical reasons? No [0] : 2) Feeling down, depressed, or hopeless: Not at all (0) [Audit-CScore] : 2 [[id number]] : 0 [Patient reported colonoscopy was abnormal] : Patient reported colonoscopy was abnormal [HIV Test offered] : HIV Test offered [Change in mental status noted] : No change in mental status noted [Language] : denies difficulty with language [Behavior] : denies difficulty with behavior [Learning/Retaining New Information] : denies difficulty learning/retaining new information [Handling Complex Tasks] : denies difficulty handling complex tasks [Reasoning] : denies difficulty with reasoning [Spatial Ability and Orientation] : denies difficulty with spatial ability and orientation [None] : None [With Family] : lives with family [Employed] : employed [Sexually Active] : not sexually active [High Risk Behavior] : no high risk behavior [Feels Safe at Home] : Feels safe at home [Fully functional (bathing, dressing, toileting, transferring, walking, feeding)] : Fully functional (bathing, dressing, toileting, transferring, walking, feeding) [Fully functional (using the telephone, shopping, preparing meals, housekeeping, doing laundry, using] : Fully functional and needs no help or supervision to perform IADLs (using the telephone, shopping, preparing meals, housekeeping, doing laundry, using transportation, managing medications and managing finances) [ColonoscopyDate] : 06/2020 [ColonoscopyComments] : reepat in 5 years  [HepatitisCComments] : negative  [HepatitisCDate] : 02/2019 [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

## 2024-09-09 ENCOUNTER — EMERGENCY (EMERGENCY)
Facility: HOSPITAL | Age: 24
LOS: 0 days | Discharge: HOME | End: 2024-09-09
Attending: EMERGENCY MEDICINE
Payer: MEDICAID

## 2024-09-09 ENCOUNTER — TRANSCRIPTION ENCOUNTER (OUTPATIENT)
Age: 24
End: 2024-09-09

## 2024-09-09 VITALS
OXYGEN SATURATION: 100 % | TEMPERATURE: 99 F | HEART RATE: 81 BPM | WEIGHT: 105.38 LBS | DIASTOLIC BLOOD PRESSURE: 70 MMHG | HEIGHT: 61 IN | SYSTOLIC BLOOD PRESSURE: 105 MMHG | RESPIRATION RATE: 20 BRPM

## 2024-09-09 LAB
ALBUMIN SERPL ELPH-MCNC: 4.7 G/DL — SIGNIFICANT CHANGE UP (ref 3.5–5.2)
ALP SERPL-CCNC: 51 U/L — SIGNIFICANT CHANGE UP (ref 30–115)
ALT FLD-CCNC: 12 U/L — SIGNIFICANT CHANGE UP (ref 0–41)
ANION GAP SERPL CALC-SCNC: 11 MMOL/L — SIGNIFICANT CHANGE UP (ref 7–14)
AST SERPL-CCNC: 18 U/L — SIGNIFICANT CHANGE UP (ref 0–41)
BASOPHILS # BLD AUTO: 0.03 K/UL — SIGNIFICANT CHANGE UP (ref 0–0.2)
BASOPHILS NFR BLD AUTO: 0.3 % — SIGNIFICANT CHANGE UP (ref 0–1)
BILIRUB SERPL-MCNC: 0.7 MG/DL — SIGNIFICANT CHANGE UP (ref 0.2–1.2)
BUN SERPL-MCNC: 10 MG/DL — SIGNIFICANT CHANGE UP (ref 10–20)
CALCIUM SERPL-MCNC: 9.4 MG/DL — SIGNIFICANT CHANGE UP (ref 8.4–10.5)
CHLORIDE SERPL-SCNC: 105 MMOL/L — SIGNIFICANT CHANGE UP (ref 98–110)
CO2 SERPL-SCNC: 21 MMOL/L — SIGNIFICANT CHANGE UP (ref 17–32)
CREAT SERPL-MCNC: 0.6 MG/DL — LOW (ref 0.7–1.5)
EGFR: 128 ML/MIN/1.73M2 — SIGNIFICANT CHANGE UP
EOSINOPHIL # BLD AUTO: 0.06 K/UL — SIGNIFICANT CHANGE UP (ref 0–0.7)
EOSINOPHIL NFR BLD AUTO: 0.6 % — SIGNIFICANT CHANGE UP (ref 0–8)
GLUCOSE SERPL-MCNC: 87 MG/DL — SIGNIFICANT CHANGE UP (ref 70–99)
HCG SERPL QL: POSITIVE
HCG SERPL-ACNC: 538.9 MIU/ML — HIGH
HCT VFR BLD CALC: 37.6 % — SIGNIFICANT CHANGE UP (ref 37–47)
HGB BLD-MCNC: 12.6 G/DL — SIGNIFICANT CHANGE UP (ref 12–16)
IMM GRANULOCYTES NFR BLD AUTO: 0.3 % — SIGNIFICANT CHANGE UP (ref 0.1–0.3)
LIDOCAIN IGE QN: 41 U/L — SIGNIFICANT CHANGE UP (ref 7–60)
LYMPHOCYTES # BLD AUTO: 1.76 K/UL — SIGNIFICANT CHANGE UP (ref 1.2–3.4)
LYMPHOCYTES # BLD AUTO: 18.8 % — LOW (ref 20.5–51.1)
MCHC RBC-ENTMCNC: 29.4 PG — SIGNIFICANT CHANGE UP (ref 27–31)
MCHC RBC-ENTMCNC: 33.5 G/DL — SIGNIFICANT CHANGE UP (ref 32–37)
MCV RBC AUTO: 87.6 FL — SIGNIFICANT CHANGE UP (ref 81–99)
MONOCYTES # BLD AUTO: 0.47 K/UL — SIGNIFICANT CHANGE UP (ref 0.1–0.6)
MONOCYTES NFR BLD AUTO: 5 % — SIGNIFICANT CHANGE UP (ref 1.7–9.3)
NEUTROPHILS # BLD AUTO: 7 K/UL — HIGH (ref 1.4–6.5)
NEUTROPHILS NFR BLD AUTO: 75 % — SIGNIFICANT CHANGE UP (ref 42.2–75.2)
NRBC # BLD: 0 /100 WBCS — SIGNIFICANT CHANGE UP (ref 0–0)
PLATELET # BLD AUTO: 336 K/UL — SIGNIFICANT CHANGE UP (ref 130–400)
PMV BLD: 10.1 FL — SIGNIFICANT CHANGE UP (ref 7.4–10.4)
POTASSIUM SERPL-MCNC: 4.7 MMOL/L — SIGNIFICANT CHANGE UP (ref 3.5–5)
POTASSIUM SERPL-SCNC: 4.7 MMOL/L — SIGNIFICANT CHANGE UP (ref 3.5–5)
PROT SERPL-MCNC: 7.1 G/DL — SIGNIFICANT CHANGE UP (ref 6–8)
RBC # BLD: 4.29 M/UL — SIGNIFICANT CHANGE UP (ref 4.2–5.4)
RBC # FLD: 12.2 % — SIGNIFICANT CHANGE UP (ref 11.5–14.5)
SODIUM SERPL-SCNC: 137 MMOL/L — SIGNIFICANT CHANGE UP (ref 135–146)
WBC # BLD: 9.35 K/UL — SIGNIFICANT CHANGE UP (ref 4.8–10.8)
WBC # FLD AUTO: 9.35 K/UL — SIGNIFICANT CHANGE UP (ref 4.8–10.8)

## 2024-09-09 PROCEDURE — 76856 US EXAM PELVIC COMPLETE: CPT

## 2024-09-09 PROCEDURE — 80053 COMPREHEN METABOLIC PANEL: CPT

## 2024-09-09 PROCEDURE — 84702 CHORIONIC GONADOTROPIN TEST: CPT

## 2024-09-09 PROCEDURE — 84703 CHORIONIC GONADOTROPIN ASSAY: CPT

## 2024-09-09 PROCEDURE — 87186 SC STD MICRODIL/AGAR DIL: CPT

## 2024-09-09 PROCEDURE — 99284 EMERGENCY DEPT VISIT MOD MDM: CPT

## 2024-09-09 PROCEDURE — 99284 EMERGENCY DEPT VISIT MOD MDM: CPT | Mod: 25

## 2024-09-09 PROCEDURE — 85025 COMPLETE CBC W/AUTO DIFF WBC: CPT

## 2024-09-09 PROCEDURE — 87086 URINE CULTURE/COLONY COUNT: CPT

## 2024-09-09 PROCEDURE — 36415 COLL VENOUS BLD VENIPUNCTURE: CPT

## 2024-09-09 PROCEDURE — 96374 THER/PROPH/DIAG INJ IV PUSH: CPT

## 2024-09-09 PROCEDURE — 83690 ASSAY OF LIPASE: CPT

## 2024-09-09 PROCEDURE — 76856 US EXAM PELVIC COMPLETE: CPT | Mod: 26

## 2024-09-09 RX ORDER — ONDANSETRON 2 MG/ML
1 INJECTION, SOLUTION INTRAMUSCULAR; INTRAVENOUS
Qty: 6 | Refills: 0
Start: 2024-09-09 | End: 2024-09-11

## 2024-09-09 RX ORDER — METOCLOPRAMIDE HCL 5 MG
10 TABLET ORAL ONCE
Refills: 0 | Status: COMPLETED | OUTPATIENT
Start: 2024-09-09 | End: 2024-09-09

## 2024-09-09 RX ORDER — SODIUM CHLORIDE 9 MG/ML
1000 INJECTION INTRAMUSCULAR; INTRAVENOUS; SUBCUTANEOUS ONCE
Refills: 0 | Status: COMPLETED | OUTPATIENT
Start: 2024-09-09 | End: 2024-09-09

## 2024-09-09 RX ORDER — ONDANSETRON 2 MG/ML
4 INJECTION, SOLUTION INTRAMUSCULAR; INTRAVENOUS ONCE
Refills: 0 | Status: DISCONTINUED | OUTPATIENT
Start: 2024-09-09 | End: 2024-09-09

## 2024-09-09 RX ADMIN — SODIUM CHLORIDE 1000 MILLILITER(S): 9 INJECTION INTRAMUSCULAR; INTRAVENOUS; SUBCUTANEOUS at 16:36

## 2024-09-09 RX ADMIN — Medication 104 MILLIGRAM(S): at 18:38

## 2024-09-09 NOTE — ED PROVIDER NOTE - PATIENT PORTAL LINK FT
You can access the FollowMyHealth Patient Portal offered by [facility] by registering at the following website: [email or web address]. By joining [employer]’s FollowMyHealth portal, you will also be able to view your health information using other applications (apps) compatible with our system.

## 2024-09-09 NOTE — ED PROVIDER NOTE - OBJECTIVE STATEMENT
23-year-old female denies significant past medical history presents complaint of nausea vomiting and diarrhea.  States yesterday she ate chicken from takeout and noted that some of the chicken was raw.  Reports since then she had multiple episodes of NBNB emesis and watery nonbloody diarrhea which stopped 1 hour prior to arrival in emergency department.  Endorses persistent nausea.  States she does not have any abdominal pain.  Also reports she believes she may be pregnant as she has not had her period in the past month.  Denies fever/chills, chest pain, shortness of breath, abdominal pain, dysuria/frequency/urgency/hematuria, vaginal bleeding/discharge, lightheadedness, dizziness.

## 2024-09-09 NOTE — ED PROVIDER NOTE - CLINICAL SUMMARY MEDICAL DECISION MAKING FREE TEXT BOX
23-year-old female presents to the ED for nausea vomiting diarrhea.  Last LMP 4 weeks ago.  Physical exam is unremarkable.  Abdomen soft nondistended.  We obtained labs along with ultrasound.  Patient did not want to wait for the results of the ultrasound.  Beta-hCG of 500.  Denies any vaginal bleeding.  Patient wanted to leave prior to ultrasound results.  AMA form signed.  Risks conveyed to patient.  Patient had capacity.  Currently overall well-appearing and tolerating p.o.  No UA resulted.  Discharged with AMA form signed.  Return precautions given.  Zofran sent to the pharmacy.

## 2024-09-09 NOTE — ED ADULT TRIAGE NOTE - CHIEF COMPLAINT QUOTE
Pt states she ate chicken from a restaurant yesterday and while eating noticed the chicken was raw, pt now c/o abdomina pain, n/v/d

## 2024-09-09 NOTE — ED PROVIDER NOTE - NSFOLLOWUPINSTRUCTIONS_ED_ALL_ED_FT
*********  You didn't want to wait for the official result of transabdominal sonogram for pelvis and possible GYN evaluation in emergency department. Please follow up with your GYN in 48 hours for repeat bHCG. Return to ED immediately for worsening symptoms and pain.   **********    (Pregnancy of unknown location) At Risk Ectopic Pregnancy    WHAT YOU NEED TO KNOW:    Ectopic pregnancy occurs when a fertilized egg attaches and begins to grow outside of the uterus. The most common place for this to happen is in the fallopian tube. This is sometimes called a tubal pregnancy. The egg can also implant on the outside of the uterus, on the ovary or cervix, or in the abdomen. The egg may begin to grow, but the pregnancy cannot continue normally. Ectopic pregnancy can cause heavy bleeding and may be life-threatening.Ectopic Pregnancy         DISCHARGE INSTRUCTIONS:    Call your local emergency number (911 in the ) if:     You have chest pain or trouble breathing.        Call your doctor if:     You have sharp pain in your lower abdomen that is severe and starts suddenly.      You feel lightheaded or like you are going to faint.      You have increasing abdominal or pelvic pain or heavy vaginal bleeding.      You have shoulder pain.      You have a fever.      You have questions or concerns about your condition or care.    Medicines: You may need any of the following:     Prescription pain medicine may be given. Ask your healthcare provider how to take this medicine safely. Some prescription pain medicines contain acetaminophen. Do not take other medicines that contain acetaminophen without talking to your healthcare provider. Too much acetaminophen may cause liver damage. Prescription pain medicine may cause constipation. Ask your healthcare provider how to prevent or treat constipation.       Acetaminophen decreases pain and fever. It is available without a doctor's order. Ask how much to take and how often to take it. Follow directions. Read the labels of all other medicines you are using to see if they also contain acetaminophen, or ask your doctor or pharmacist. Acetaminophen can cause liver damage if not taken correctly. Do not use more than 4 grams (4,000 milligrams) total of acetaminophen in one day.       Take your medicine as directed. Contact your healthcare provider if you think your medicine is not helping or if you have side effects. Tell him or her if you are allergic to any medicine. Keep a list of the medicines, vitamins, and herbs you take. Include the amounts, and when and why you take them. Bring the list or the pill bottles to follow-up visits. Carry your medicine list with you in case of an emergency.    If you received methotrexate:     Do not have sex, and limit physical activity. Heavy physical activity or sexual intercourse may cause the ectopic pregnancy to rupture. This can be life-threatening. Your healthcare provider will tell you when it is okay to have sex and return to your normal activities.      Do not get pregnant until your healthcare provider says it is okay. Methotrexate will be harmful to an unborn baby. You will need to wait until at least 1 monthly cycle after you finish the methotrexate course to get pregnant. Your provider may want you to wait until after you have had 3 monthly cycles. This will help make sure all the methotrexate is out of your body.      Avoid folic acid and NSAID medicines. Folic acid, and NSAIDs, such as ibuprofen, prevent methotrexate from working correctly. Do not take folic acid supplements or have foods that are high in folic acid. Examples include orange juice, breakfast cereal, and leafy green vegetables. Your healthcare provider can give you more information on foods to avoid.      You may have some spotting and abdominal pain. This may start a few days after you begin taking methotrexate. These are normal and should only last a short time. Talk to your healthcare provider if you have any concerns.      Limit sunlight exposure. Sunlight can cause a condition caused methotrexate dermatitis (skin irritation).    For support and more information:     The [employer]  [address] [address]  [place] [address]  Phone: [phone]  Phone: [phone]  Web Address: http://www.acog.org      Follow up with your gynecologist as directed: You may need to return for a follow-up exam, treatment, or blood tests. If you received methotrexate to stop your pregnancy, it is important to come in for follow-up tests. Write down your questions so you remember to ask them during your visits. *********  You didn't want to wait for the official result of transabdominal sonogram for pelvis and possible GYN evaluation in emergency department. Please follow up with your GYN in 48 hours for repeat bHCG. Return to ED immediately for worsening symptoms and pain.   **********    (Pregnancy of unknown location) At Risk Ectopic Pregnancy    WHAT YOU NEED TO KNOW:    Ectopic pregnancy occurs when a fertilized egg attaches and begins to grow outside of the uterus. The most common place for this to happen is in the fallopian tube. This is sometimes called a tubal pregnancy. The egg can also implant on the outside of the uterus, on the ovary or cervix, or in the abdomen. The egg may begin to grow, but the pregnancy cannot continue normally. Ectopic pregnancy can cause heavy bleeding and may be life-threatening.Ectopic Pregnancy         DISCHARGE INSTRUCTIONS:    Call your local emergency number (911 in the ) if:     You have chest pain or trouble breathing.        Call your doctor if:     You have sharp pain in your lower abdomen that is severe and starts suddenly.      You feel lightheaded or like you are going to faint.      You have increasing abdominal or pelvic pain or heavy vaginal bleeding.      You have shoulder pain.      You have a fever.      You have questions or concerns about your condition or care.    Medicines: You may need any of the following:     Prescription pain medicine may be given. Ask your healthcare provider how to take this medicine safely. Some prescription pain medicines contain acetaminophen. Do not take other medicines that contain acetaminophen without talking to your healthcare provider. Too much acetaminophen may cause liver damage. Prescription pain medicine may cause constipation. Ask your healthcare provider how to prevent or treat constipation.       Acetaminophen decreases pain and fever. It is available without a doctor's order. Ask how much to take and how often to take it. Follow directions. Read the labels of all other medicines you are using to see if they also contain acetaminophen, or ask your doctor or pharmacist. Acetaminophen can cause liver damage if not taken correctly. Do not use more than 4 grams (4,000 milligrams) total of acetaminophen in one day.       Take your medicine as directed. Contact your healthcare provider if you think your medicine is not helping or if you have side effects. Tell him or her if you are allergic to any medicine. Keep a list of the medicines, vitamins, and herbs you take. Include the amounts, and when and why you take them. Bring the list or the pill bottles to follow-up visits. Carry your medicine list with you in case of an emergency.    If you received methotrexate:     Do not have sex, and limit physical activity. Heavy physical activity or sexual intercourse may cause the ectopic pregnancy to rupture. This can be life-threatening. Your healthcare provider will tell you when it is okay to have sex and return to your normal activities.      Do not get pregnant until your healthcare provider says it is okay. Methotrexate will be harmful to an unborn baby. You will need to wait until at least 1 monthly cycle after you finish the methotrexate course to get pregnant. Your provider may want you to wait until after you have had 3 monthly cycles. This will help make sure all the methotrexate is out of your body.      Avoid folic acid and NSAID medicines. Folic acid, and NSAIDs, such as ibuprofen, prevent methotrexate from working correctly. Do not take folic acid supplements or have foods that are high in folic acid. Examples include orange juice, breakfast cereal, and leafy green vegetables. Your healthcare provider can give you more information on foods to avoid.      You may have some spotting and abdominal pain. This may start a few days after you begin taking methotrexate. These are normal and should only last a short time. Talk to your healthcare provider if you have any concerns.      Limit sunlight exposure. Sunlight can cause a condition caused methotrexate dermatitis (skin irritation).    For support and more information:     The [employer]  [address] [address]  [place] [address]  Phone: [phone]  Phone: [phone]  Web Address: http://www.acog.org      Follow up with your gynecologist as directed: You may need to return for a follow-up exam, treatment, or blood tests. If you received methotrexate to stop your pregnancy, it is important to come in for follow-up tests. Write down your questions so you remember to ask them during your visits.      Gastroenteritis    WHAT YOU NEED TO KNOW:  Gastroenteritis, or stomach flu or from bad food, is an infection of the stomach and intestines. Digestive Tract     DISCHARGE INSTRUCTIONS:  Call 911 for any of the following:   You have trouble breathing or a very fast pulse.    Return to the emergency department if:   You see blood in your diarrhea.  You cannot stop vomiting.  You have not urinated for 12 hours.   You feel like you are going to faint.    Contact your healthcare provider if:   You have a fever.  You continue to vomit or have diarrhea, even after treatment.  You see worms in your diarrhea.  Your mouth or eyes are dry. You are not urinating as much or as often.  You have questions or concerns about your condition or care.    Medicines:     Medicines may be given to stop vomiting or diarrhea, decrease abdominal cramps, or treat an infection.    Take your medicine as directed. Contact your healthcare provider if you think your medicine is not helping or if you have side effects. Tell him or her if you are allergic to any medicine. Keep a list of the medicines, vitamins, and herbs you take. Include the amounts, and when and why you take them. Bring the list or the pill bottles to follow-up visits. Carry your medicine list with you in case of an emergency.    Manage your symptoms:   Drink liquids as directed. Ask your healthcare provider how much liquid to drink each day, and which liquids are best for you. You may also need to drink an oral rehydration solution (ORS). An ORS has the right amounts of sugar, salt, and minerals in water to replace body fluids.    Eat bland foods. When you feel hungry, begin eating soft, bland foods. Examples are bananas, clear soup, potatoes, and applesauce. Do not have dairy products, alcohol, sugary drinks, or drinks with caffeine until you feel better.    Rest as much as possible. Slowly start to do more each day when you begin to feel better.    Prevent the spread of gastroenteritis: Gastroenteritis can spread easily. Keep yourself, your family, and your surroundings clean to help prevent the spread of gastroenteritis:     Wash your hands often. Use soap and water. Wash your hands after you use the bathroom, change a child's diapers, or sneeze. Wash your hands before you prepare or eat food. Handwashing     Clean surfaces and do laundry often. Wash your clothes and towels separately from the rest of the laundry. Clean surfaces in your home with antibacterial  or bleach.    Clean food thoroughly and cook safely. Wash raw vegetables before you cook. Cook meat, fish, and eggs fully. Do not use the same dishes for raw meat as you do for other foods. Refrigerate any leftover food immediately.    Be aware when you camp or travel. Drink only clean water. Do not drink from rivers or lakes unless you purify or boil the water first. When you travel, drink bottled water and do not add ice. Do not eat fruit that has not been peeled. Do not eat raw fish or meat that is not fully cooked.     Follow up with your healthcare provider as directed: Write down your questions so you remember to ask them during your visits.

## 2024-09-09 NOTE — ED PROVIDER NOTE - PHYSICAL EXAMINATION
Vital Signs: I have reviewed the initial vital signs.  Constitutional: appears stated age, no acute distress  Eyes: Sclera clear, EOMI.  Cardiovascular: S1 and S2, regular rate, regular rhythm, well-perfused extremities, radial pulses equal and 2+, pedal pulses 2+ and equal  Respiratory: unlabored respiratory effort, clear to auscultation bilaterally no wheezing, rales, or rhonchi  Gastrointestinal:  abdomen soft, non-tender, no CVA tenderness bilaterally  Musculoskeletal: supple neck, no lower extremity edema  Integumentary: warm, dry, no rash  Neurologic: awake, alert, oriented x3, extremities’ motor and sensory functions grossly intact

## 2024-09-09 NOTE — ED ADULT NURSE NOTE - NSFALLUNIVINTERV_ED_ALL_ED
Bed/Stretcher in lowest position, wheels locked, appropriate side rails in place/Call bell, personal items and telephone in reach/Instruct patient to call for assistance before getting out of bed/chair/stretcher/Non-slip footwear applied when patient is off stretcher/[place] to call system/Physically safe environment - no spills, clutter or unnecessary equipment/Purposeful proactive rounding/Room/bathroom lighting operational, light cord in reach

## 2024-09-09 NOTE — ED PROVIDER NOTE - PROGRESS NOTE DETAILS
AY: Patient deferred transvaginal ultrasound as she believes that a transvaginal ultrasound is what may have contributed to a miscarriage for her in the past.  Explained to patient's that a transabdominal pelvic ultrasound would not rule out ectopic pregnancy and may not fully evaluate her for pelvic pathology which would put her at risk of having an undiagnosed intrapelvic pathology which would increase her chance of untreated intrapelvic pathology.  Patient states she understands this and was given opportunities ask questions and further endorsed understanding.

## 2024-09-11 DIAGNOSIS — R10.9 UNSPECIFIED ABDOMINAL PAIN: ICD-10-CM

## 2024-09-11 DIAGNOSIS — O21.9 VOMITING OF PREGNANCY, UNSPECIFIED: ICD-10-CM

## 2024-09-11 DIAGNOSIS — R19.7 DIARRHEA, UNSPECIFIED: ICD-10-CM

## 2024-09-11 DIAGNOSIS — Z3A.01 LESS THAN 8 WEEKS GESTATION OF PREGNANCY: ICD-10-CM

## 2024-09-11 DIAGNOSIS — Z53.29 PROCEDURE AND TREATMENT NOT CARRIED OUT BECAUSE OF PATIENT'S DECISION FOR OTHER REASONS: ICD-10-CM

## 2024-09-11 DIAGNOSIS — Z88.0 ALLERGY STATUS TO PENICILLIN: ICD-10-CM

## 2024-09-11 DIAGNOSIS — O99.891 OTHER SPECIFIED DISEASES AND CONDITIONS COMPLICATING PREGNANCY: ICD-10-CM

## 2024-09-13 RX ORDER — NITROFURANTOIN MONOHYD/M-CRYST 100 MG
1 CAPSULE ORAL
Qty: 14 | Refills: 0
Start: 2024-09-13 | End: 2024-09-19